# Patient Record
Sex: FEMALE | Race: WHITE | Employment: OTHER | URBAN - METROPOLITAN AREA
[De-identification: names, ages, dates, MRNs, and addresses within clinical notes are randomized per-mention and may not be internally consistent; named-entity substitution may affect disease eponyms.]

---

## 2019-12-03 ENCOUNTER — HOSPITAL ENCOUNTER (EMERGENCY)
Age: 79
Discharge: HOME | End: 2019-12-03
Attending: EMERGENCY MEDICINE
Payer: COMMERCIAL

## 2019-12-03 ENCOUNTER — APPOINTMENT (OUTPATIENT)
Dept: GENERAL RADIOLOGY | Age: 79
End: 2019-12-03
Payer: COMMERCIAL

## 2019-12-03 VITALS
DIASTOLIC BLOOD PRESSURE: 69 MMHG | TEMPERATURE: 97.9 F | SYSTOLIC BLOOD PRESSURE: 109 MMHG | OXYGEN SATURATION: 92 % | WEIGHT: 175 LBS | BODY MASS INDEX: 28.25 KG/M2 | HEART RATE: 67 BPM | RESPIRATION RATE: 19 BRPM

## 2019-12-03 DIAGNOSIS — R53.83 OTHER FATIGUE: Primary | ICD-10-CM

## 2019-12-03 DIAGNOSIS — N30.00 ACUTE CYSTITIS WITHOUT HEMATURIA: ICD-10-CM

## 2019-12-03 LAB
A/G RATIO: 1.9 (ref 1.1–2.2)
ALBUMIN SERPL-MCNC: 4.3 G/DL (ref 3.4–5)
ALP BLD-CCNC: 91 U/L (ref 40–129)
ALT SERPL-CCNC: <5 U/L (ref 10–40)
ANION GAP SERPL CALCULATED.3IONS-SCNC: 14 MMOL/L (ref 3–16)
AST SERPL-CCNC: 20 U/L (ref 15–37)
BASOPHILS ABSOLUTE: 0 K/UL (ref 0–0.2)
BASOPHILS RELATIVE PERCENT: 0.6 %
BILIRUB SERPL-MCNC: 0.9 MG/DL (ref 0–1)
BILIRUBIN URINE: ABNORMAL
BLOOD, URINE: NEGATIVE
BUN BLDV-MCNC: 25 MG/DL (ref 7–20)
CALCIUM SERPL-MCNC: 9.4 MG/DL (ref 8.3–10.6)
CHLORIDE BLD-SCNC: 100 MMOL/L (ref 99–110)
CLARITY: ABNORMAL
CO2: 27 MMOL/L (ref 21–32)
COLOR: YELLOW
CREAT SERPL-MCNC: 0.9 MG/DL (ref 0.6–1.2)
EKG ATRIAL RATE: 63 BPM
EKG DIAGNOSIS: NORMAL
EKG P AXIS: 68 DEGREES
EKG P-R INTERVAL: 184 MS
EKG Q-T INTERVAL: 454 MS
EKG QRS DURATION: 90 MS
EKG QTC CALCULATION (BAZETT): 464 MS
EKG R AXIS: -26 DEGREES
EKG T AXIS: 87 DEGREES
EKG VENTRICULAR RATE: 63 BPM
EOSINOPHILS ABSOLUTE: 0.1 K/UL (ref 0–0.6)
EOSINOPHILS RELATIVE PERCENT: 1.9 %
EPITHELIAL CELLS, UA: 0 /HPF (ref 0–5)
GFR AFRICAN AMERICAN: >60
GFR NON-AFRICAN AMERICAN: >60
GLOBULIN: 2.3 G/DL
GLUCOSE BLD-MCNC: 109 MG/DL (ref 70–99)
GLUCOSE URINE: NEGATIVE MG/DL
HCT VFR BLD CALC: 42.5 % (ref 36–48)
HEMOGLOBIN: 14.4 G/DL (ref 12–16)
HYALINE CASTS: 1 /LPF (ref 0–8)
KETONES, URINE: ABNORMAL MG/DL
LACTIC ACID, SEPSIS: 1.5 MMOL/L (ref 0.4–1.9)
LEUKOCYTE ESTERASE, URINE: ABNORMAL
LIPASE: 28 U/L (ref 13–60)
LYMPHOCYTES ABSOLUTE: 0.5 K/UL (ref 1–5.1)
LYMPHOCYTES RELATIVE PERCENT: 11.8 %
MCH RBC QN AUTO: 30.6 PG (ref 26–34)
MCHC RBC AUTO-ENTMCNC: 33.7 G/DL (ref 31–36)
MCV RBC AUTO: 90.6 FL (ref 80–100)
MICROSCOPIC EXAMINATION: YES
MONOCYTES ABSOLUTE: 0.5 K/UL (ref 0–1.3)
MONOCYTES RELATIVE PERCENT: 10.9 %
NEUTROPHILS ABSOLUTE: 3.3 K/UL (ref 1.7–7.7)
NEUTROPHILS RELATIVE PERCENT: 74.8 %
NITRITE, URINE: NEGATIVE
PDW BLD-RTO: 13.9 % (ref 12.4–15.4)
PH UA: 5.5 (ref 5–8)
PLATELET # BLD: 141 K/UL (ref 135–450)
PMV BLD AUTO: 9.9 FL (ref 5–10.5)
POTASSIUM SERPL-SCNC: 3.4 MMOL/L (ref 3.5–5.1)
PRO-BNP: 2041 PG/ML (ref 0–449)
PROTEIN UA: NEGATIVE MG/DL
RBC # BLD: 4.69 M/UL (ref 4–5.2)
RBC UA: 2 /HPF (ref 0–4)
REASON FOR REJECTION: NORMAL
REJECTED TEST: NORMAL
SODIUM BLD-SCNC: 141 MMOL/L (ref 136–145)
SPECIFIC GRAVITY UA: 1.02 (ref 1–1.03)
TOTAL PROTEIN: 6.6 G/DL (ref 6.4–8.2)
TROPONIN: <0.01 NG/ML
URINE REFLEX TO CULTURE: YES
URINE TYPE: ABNORMAL
UROBILINOGEN, URINE: 1 E.U./DL
WBC # BLD: 4.4 K/UL (ref 4–11)
WBC UA: 1 /HPF (ref 0–5)

## 2019-12-03 PROCEDURE — 87086 URINE CULTURE/COLONY COUNT: CPT

## 2019-12-03 PROCEDURE — 84484 ASSAY OF TROPONIN QUANT: CPT

## 2019-12-03 PROCEDURE — 85025 COMPLETE CBC W/AUTO DIFF WBC: CPT

## 2019-12-03 PROCEDURE — 87040 BLOOD CULTURE FOR BACTERIA: CPT

## 2019-12-03 PROCEDURE — 81001 URINALYSIS AUTO W/SCOPE: CPT

## 2019-12-03 PROCEDURE — 83605 ASSAY OF LACTIC ACID: CPT

## 2019-12-03 PROCEDURE — 80053 COMPREHEN METABOLIC PANEL: CPT

## 2019-12-03 PROCEDURE — 71045 X-RAY EXAM CHEST 1 VIEW: CPT

## 2019-12-03 PROCEDURE — 99283 EMERGENCY DEPT VISIT LOW MDM: CPT

## 2019-12-03 PROCEDURE — 93005 ELECTROCARDIOGRAM TRACING: CPT | Performed by: PHYSICIAN ASSISTANT

## 2019-12-03 PROCEDURE — 6370000000 HC RX 637 (ALT 250 FOR IP): Performed by: PHYSICIAN ASSISTANT

## 2019-12-03 PROCEDURE — 93010 ELECTROCARDIOGRAM REPORT: CPT | Performed by: INTERNAL MEDICINE

## 2019-12-03 PROCEDURE — 83880 ASSAY OF NATRIURETIC PEPTIDE: CPT

## 2019-12-03 PROCEDURE — 83690 ASSAY OF LIPASE: CPT

## 2019-12-03 RX ORDER — CEPHALEXIN 250 MG/1
500 CAPSULE ORAL ONCE
Status: COMPLETED | OUTPATIENT
Start: 2019-12-03 | End: 2019-12-03

## 2019-12-03 RX ORDER — ONDANSETRON 2 MG/ML
4 INJECTION INTRAMUSCULAR; INTRAVENOUS ONCE
Status: DISCONTINUED | OUTPATIENT
Start: 2019-12-03 | End: 2019-12-03 | Stop reason: HOSPADM

## 2019-12-03 RX ORDER — HYDROCODONE BITARTRATE AND ACETAMINOPHEN 5; 325 MG/1; MG/1
1 TABLET ORAL 3 TIMES DAILY
COMMUNITY
End: 2020-02-10 | Stop reason: CLARIF

## 2019-12-03 RX ORDER — CARVEDILOL 3.12 MG/1
3.12 TABLET ORAL 2 TIMES DAILY WITH MEALS
COMMUNITY

## 2019-12-03 RX ORDER — CARBIDOPA AND LEVODOPA 25; 100 MG/1; MG/1
2 TABLET, EXTENDED RELEASE ORAL NIGHTLY
COMMUNITY

## 2019-12-03 RX ORDER — GABAPENTIN 300 MG/1
600 CAPSULE ORAL 2 TIMES DAILY
COMMUNITY
End: 2020-02-10 | Stop reason: CLARIF

## 2019-12-03 RX ORDER — SPIRONOLACTONE 25 MG/1
12.5 TABLET ORAL DAILY
COMMUNITY

## 2019-12-03 RX ORDER — POLYETHYLENE GLYCOL 3350 17 G/17G
17 POWDER, FOR SOLUTION ORAL DAILY
COMMUNITY

## 2019-12-03 RX ORDER — ALBUTEROL SULFATE 2.5 MG/3ML
2.5 SOLUTION RESPIRATORY (INHALATION) EVERY 6 HOURS PRN
COMMUNITY

## 2019-12-03 RX ORDER — CETIRIZINE HYDROCHLORIDE 10 MG/1
10 TABLET ORAL DAILY
COMMUNITY
End: 2020-02-10 | Stop reason: CLARIF

## 2019-12-03 RX ORDER — CEPHALEXIN 500 MG/1
500 CAPSULE ORAL 2 TIMES DAILY
Qty: 14 CAPSULE | Refills: 0 | Status: SHIPPED | OUTPATIENT
Start: 2019-12-03 | End: 2019-12-10

## 2019-12-03 RX ORDER — HYDROCODONE BITARTRATE AND ACETAMINOPHEN 5; 325 MG/1; MG/1
0.5 TABLET ORAL EVERY 4 HOURS PRN
COMMUNITY
End: 2020-02-10 | Stop reason: CLARIF

## 2019-12-03 RX ADMIN — CEPHALEXIN 500 MG: 250 CAPSULE ORAL at 13:56

## 2019-12-03 ASSESSMENT — ENCOUNTER SYMPTOMS
SHORTNESS OF BREATH: 0
DIARRHEA: 0
VOMITING: 1
ABDOMINAL PAIN: 0
COUGH: 0
RHINORRHEA: 0
NAUSEA: 1

## 2019-12-05 LAB — URINE CULTURE, ROUTINE: NORMAL

## 2019-12-07 LAB
BLOOD CULTURE, ROUTINE: NORMAL
CULTURE, BLOOD 2: NORMAL

## 2020-02-10 ENCOUNTER — HOSPITAL ENCOUNTER (INPATIENT)
Age: 80
LOS: 2 days | Discharge: NURSING, REHAB OR LONG-TERM CARE | DRG: 690 | End: 2020-02-12
Attending: EMERGENCY MEDICINE | Admitting: INTERNAL MEDICINE
Payer: COMMERCIAL

## 2020-02-10 ENCOUNTER — APPOINTMENT (OUTPATIENT)
Dept: GENERAL RADIOLOGY | Age: 80
DRG: 690 | End: 2020-02-10
Payer: COMMERCIAL

## 2020-02-10 ENCOUNTER — APPOINTMENT (OUTPATIENT)
Dept: CT IMAGING | Age: 80
DRG: 690 | End: 2020-02-10
Payer: COMMERCIAL

## 2020-02-10 PROBLEM — N39.0 UTI (URINARY TRACT INFECTION): Status: ACTIVE | Noted: 2020-02-10

## 2020-02-10 LAB
A/G RATIO: 1.4 (ref 1.1–2.2)
ALBUMIN SERPL-MCNC: 4.3 G/DL (ref 3.4–5)
ALP BLD-CCNC: 113 U/L (ref 40–129)
ALT SERPL-CCNC: 8 U/L (ref 10–40)
ANION GAP SERPL CALCULATED.3IONS-SCNC: 15 MMOL/L (ref 3–16)
AST SERPL-CCNC: 17 U/L (ref 15–37)
BACTERIA: ABNORMAL /HPF
BASE EXCESS VENOUS: 9.7 MMOL/L (ref -2–3)
BASOPHILS ABSOLUTE: 0.1 K/UL (ref 0–0.2)
BASOPHILS RELATIVE PERCENT: 1 %
BILIRUB SERPL-MCNC: 0.9 MG/DL (ref 0–1)
BILIRUBIN URINE: ABNORMAL
BLOOD, URINE: ABNORMAL
BUN BLDV-MCNC: 25 MG/DL (ref 7–20)
CALCIUM SERPL-MCNC: 10.2 MG/DL (ref 8.3–10.6)
CARBOXYHEMOGLOBIN: 0.9 % (ref 0–1.5)
CHLORIDE BLD-SCNC: 96 MMOL/L (ref 99–110)
CLARITY: ABNORMAL
CO2: 30 MMOL/L (ref 21–32)
COLOR: ABNORMAL
CREAT SERPL-MCNC: 1 MG/DL (ref 0.6–1.2)
EKG ATRIAL RATE: 68 BPM
EKG DIAGNOSIS: NORMAL
EKG P-R INTERVAL: 158 MS
EKG Q-T INTERVAL: 552 MS
EKG QRS DURATION: 86 MS
EKG QTC CALCULATION (BAZETT): 586 MS
EKG R AXIS: -28 DEGREES
EKG T AXIS: 74 DEGREES
EKG VENTRICULAR RATE: 68 BPM
EOSINOPHILS ABSOLUTE: 0.2 K/UL (ref 0–0.6)
EOSINOPHILS RELATIVE PERCENT: 2.7 %
EPITHELIAL CELLS, UA: ABNORMAL /HPF
GFR AFRICAN AMERICAN: >60
GFR NON-AFRICAN AMERICAN: 53
GLOBULIN: 3 G/DL
GLUCOSE BLD-MCNC: 86 MG/DL (ref 70–99)
GLUCOSE URINE: NEGATIVE MG/DL
HCO3 VENOUS: 36.5 MMOL/L (ref 24–28)
HCT VFR BLD CALC: 46.2 % (ref 36–48)
HEMOGLOBIN, VEN, REDUCED: 71 %
HEMOGLOBIN: 15.1 G/DL (ref 12–16)
KETONES, URINE: 15 MG/DL
LACTIC ACID: 1.4 MMOL/L (ref 0.4–2)
LEUKOCYTE ESTERASE, URINE: ABNORMAL
LYMPHOCYTES ABSOLUTE: 1 K/UL (ref 1–5.1)
LYMPHOCYTES RELATIVE PERCENT: 13.3 %
MAGNESIUM: 2.1 MG/DL (ref 1.8–2.4)
MCH RBC QN AUTO: 28.8 PG (ref 26–34)
MCHC RBC AUTO-ENTMCNC: 32.6 G/DL (ref 31–36)
MCV RBC AUTO: 88.3 FL (ref 80–100)
METHEMOGLOBIN VENOUS: 0.5 % (ref 0–1.5)
MICROSCOPIC EXAMINATION: YES
MONOCYTES ABSOLUTE: 0.7 K/UL (ref 0–1.3)
MONOCYTES RELATIVE PERCENT: 8.8 %
NEUTROPHILS ABSOLUTE: 5.7 K/UL (ref 1.7–7.7)
NEUTROPHILS RELATIVE PERCENT: 74.2 %
NITRITE, URINE: NEGATIVE
O2 SAT, VEN: 28 %
PCO2, VEN: 58.6 MMHG (ref 41–51)
PDW BLD-RTO: 14.4 % (ref 12.4–15.4)
PH UA: 7.5 (ref 5–8)
PH VENOUS: 7.41 (ref 7.35–7.45)
PLATELET # BLD: 253 K/UL (ref 135–450)
PMV BLD AUTO: 10.3 FL (ref 5–10.5)
PO2, VEN: 19.7 MMHG (ref 25–40)
POTASSIUM REFLEX MAGNESIUM: 3.7 MMOL/L (ref 3.5–5.1)
PRO-BNP: 3037 PG/ML (ref 0–449)
PROTEIN UA: >=300 MG/DL
RBC # BLD: 5.23 M/UL (ref 4–5.2)
RBC UA: >100 /HPF (ref 0–2)
SODIUM BLD-SCNC: 141 MMOL/L (ref 136–145)
SPECIFIC GRAVITY UA: 1.02 (ref 1–1.03)
TCO2 CALC VENOUS: 38 MMOL/L
TOTAL PROTEIN: 7.3 G/DL (ref 6.4–8.2)
TROPONIN: 0.02 NG/ML
TROPONIN: 0.02 NG/ML
URINE REFLEX TO CULTURE: YES
URINE TYPE: ABNORMAL
UROBILINOGEN, URINE: 1 E.U./DL
WBC # BLD: 7.6 K/UL (ref 4–11)
WBC UA: >100 /HPF (ref 0–5)

## 2020-02-10 PROCEDURE — 70450 CT HEAD/BRAIN W/O DYE: CPT

## 2020-02-10 PROCEDURE — 96361 HYDRATE IV INFUSION ADD-ON: CPT

## 2020-02-10 PROCEDURE — 83880 ASSAY OF NATRIURETIC PEPTIDE: CPT

## 2020-02-10 PROCEDURE — 6360000002 HC RX W HCPCS: Performed by: EMERGENCY MEDICINE

## 2020-02-10 PROCEDURE — 87040 BLOOD CULTURE FOR BACTERIA: CPT

## 2020-02-10 PROCEDURE — 93005 ELECTROCARDIOGRAM TRACING: CPT | Performed by: EMERGENCY MEDICINE

## 2020-02-10 PROCEDURE — 99285 EMERGENCY DEPT VISIT HI MDM: CPT

## 2020-02-10 PROCEDURE — 84484 ASSAY OF TROPONIN QUANT: CPT

## 2020-02-10 PROCEDURE — 87077 CULTURE AEROBIC IDENTIFY: CPT

## 2020-02-10 PROCEDURE — 2580000003 HC RX 258: Performed by: EMERGENCY MEDICINE

## 2020-02-10 PROCEDURE — 2580000003 HC RX 258: Performed by: STUDENT IN AN ORGANIZED HEALTH CARE EDUCATION/TRAINING PROGRAM

## 2020-02-10 PROCEDURE — 87086 URINE CULTURE/COLONY COUNT: CPT

## 2020-02-10 PROCEDURE — 83605 ASSAY OF LACTIC ACID: CPT

## 2020-02-10 PROCEDURE — 83735 ASSAY OF MAGNESIUM: CPT

## 2020-02-10 PROCEDURE — 71046 X-RAY EXAM CHEST 2 VIEWS: CPT

## 2020-02-10 PROCEDURE — 85025 COMPLETE CBC W/AUTO DIFF WBC: CPT

## 2020-02-10 PROCEDURE — 96360 HYDRATION IV INFUSION INIT: CPT

## 2020-02-10 PROCEDURE — 36415 COLL VENOUS BLD VENIPUNCTURE: CPT

## 2020-02-10 PROCEDURE — 80053 COMPREHEN METABOLIC PANEL: CPT

## 2020-02-10 PROCEDURE — 81001 URINALYSIS AUTO W/SCOPE: CPT

## 2020-02-10 PROCEDURE — 82803 BLOOD GASES ANY COMBINATION: CPT

## 2020-02-10 PROCEDURE — 6360000002 HC RX W HCPCS: Performed by: STUDENT IN AN ORGANIZED HEALTH CARE EDUCATION/TRAINING PROGRAM

## 2020-02-10 PROCEDURE — 1200000000 HC SEMI PRIVATE

## 2020-02-10 RX ORDER — FLUTICASONE PROPIONATE 50 MCG
2 SPRAY, SUSPENSION (ML) NASAL DAILY
Status: DISCONTINUED | OUTPATIENT
Start: 2020-02-11 | End: 2020-02-12 | Stop reason: HOSPADM

## 2020-02-10 RX ORDER — POTASSIUM CHLORIDE 7.45 MG/ML
10 INJECTION INTRAVENOUS
Status: DISPENSED | OUTPATIENT
Start: 2020-02-10 | End: 2020-02-10

## 2020-02-10 RX ORDER — ONDANSETRON 2 MG/ML
4 INJECTION INTRAMUSCULAR; INTRAVENOUS EVERY 6 HOURS PRN
Status: CANCELLED | OUTPATIENT
Start: 2020-02-10

## 2020-02-10 RX ORDER — POTASSIUM CHLORIDE 750 MG/1
10 TABLET, EXTENDED RELEASE ORAL DAILY
Status: DISCONTINUED | OUTPATIENT
Start: 2020-02-11 | End: 2020-02-12 | Stop reason: HOSPADM

## 2020-02-10 RX ORDER — AMANTADINE HYDROCHLORIDE 100 MG/1
100 CAPSULE, GELATIN COATED ORAL DAILY
Status: DISCONTINUED | OUTPATIENT
Start: 2020-02-11 | End: 2020-02-12 | Stop reason: HOSPADM

## 2020-02-10 RX ORDER — SODIUM CHLORIDE 0.9 % (FLUSH) 0.9 %
10 SYRINGE (ML) INJECTION EVERY 12 HOURS SCHEDULED
Status: DISCONTINUED | OUTPATIENT
Start: 2020-02-10 | End: 2020-02-12 | Stop reason: HOSPADM

## 2020-02-10 RX ORDER — POTASSIUM CHLORIDE 750 MG/1
10 CAPSULE, EXTENDED RELEASE ORAL DAILY
COMMUNITY

## 2020-02-10 RX ORDER — ACETAMINOPHEN 500 MG
500 TABLET ORAL EVERY 6 HOURS PRN
Status: DISCONTINUED | OUTPATIENT
Start: 2020-02-10 | End: 2020-02-12 | Stop reason: HOSPADM

## 2020-02-10 RX ORDER — ATORVASTATIN CALCIUM 40 MG/1
40 TABLET, FILM COATED ORAL NIGHTLY
Status: DISCONTINUED | OUTPATIENT
Start: 2020-02-10 | End: 2020-02-12 | Stop reason: HOSPADM

## 2020-02-10 RX ORDER — DONEPEZIL HYDROCHLORIDE 10 MG/1
10 TABLET, ORALLY DISINTEGRATING ORAL NIGHTLY
COMMUNITY
Start: 2020-01-27

## 2020-02-10 RX ORDER — LORAZEPAM 0.5 MG/1
0.5 TABLET ORAL NIGHTLY
Status: DISCONTINUED | OUTPATIENT
Start: 2020-02-10 | End: 2020-02-12 | Stop reason: HOSPADM

## 2020-02-10 RX ORDER — THIAMINE MONONITRATE (VIT B1) 100 MG
100 TABLET ORAL DAILY
Status: DISCONTINUED | OUTPATIENT
Start: 2020-02-10 | End: 2020-02-12 | Stop reason: HOSPADM

## 2020-02-10 RX ORDER — RANITIDINE 150 MG/1
150 TABLET ORAL DAILY
COMMUNITY

## 2020-02-10 RX ORDER — ALBUTEROL SULFATE 2.5 MG/3ML
2.5 SOLUTION RESPIRATORY (INHALATION) EVERY 6 HOURS PRN
Status: DISCONTINUED | OUTPATIENT
Start: 2020-02-10 | End: 2020-02-12 | Stop reason: HOSPADM

## 2020-02-10 RX ORDER — MIRTAZAPINE 15 MG/1
15 TABLET, FILM COATED ORAL DAILY
COMMUNITY

## 2020-02-10 RX ORDER — SPIRONOLACTONE 25 MG/1
12.5 TABLET ORAL DAILY
Status: CANCELLED | OUTPATIENT
Start: 2020-02-10

## 2020-02-10 RX ORDER — GUAIFENESIN AND DEXTROMETHORPHAN HYDROBROMIDE 100; 10 MG/5ML; MG/5ML
30 SOLUTION ORAL EVERY 6 HOURS PRN
COMMUNITY

## 2020-02-10 RX ORDER — 0.9 % SODIUM CHLORIDE 0.9 %
500 INTRAVENOUS SOLUTION INTRAVENOUS ONCE
Status: COMPLETED | OUTPATIENT
Start: 2020-02-10 | End: 2020-02-10

## 2020-02-10 RX ORDER — ERGOCALCIFEROL 1.25 MG/1
50000 CAPSULE ORAL WEEKLY
Status: DISCONTINUED | OUTPATIENT
Start: 2020-02-17 | End: 2020-02-12 | Stop reason: HOSPADM

## 2020-02-10 RX ORDER — CARBIDOPA AND LEVODOPA 25; 100 MG/1; MG/1
2 TABLET, EXTENDED RELEASE ORAL NIGHTLY
Status: DISCONTINUED | OUTPATIENT
Start: 2020-02-10 | End: 2020-02-12 | Stop reason: HOSPADM

## 2020-02-10 RX ORDER — ERGOCALCIFEROL 1.25 MG/1
50000 CAPSULE ORAL WEEKLY
COMMUNITY

## 2020-02-10 RX ORDER — DONEPEZIL HYDROCHLORIDE 10 MG/1
10 TABLET, FILM COATED ORAL NIGHTLY
Status: DISCONTINUED | OUTPATIENT
Start: 2020-02-10 | End: 2020-02-12 | Stop reason: HOSPADM

## 2020-02-10 RX ORDER — GUAIFENESIN/DEXTROMETHORPHAN 100-10MG/5
10 SYRUP ORAL EVERY 6 HOURS PRN
Status: DISCONTINUED | OUTPATIENT
Start: 2020-02-10 | End: 2020-02-12 | Stop reason: HOSPADM

## 2020-02-10 RX ORDER — CARVEDILOL 3.12 MG/1
3.12 TABLET ORAL 2 TIMES DAILY WITH MEALS
Status: DISCONTINUED | OUTPATIENT
Start: 2020-02-10 | End: 2020-02-12 | Stop reason: HOSPADM

## 2020-02-10 RX ORDER — FAMOTIDINE 20 MG/1
20 TABLET, FILM COATED ORAL DAILY
Status: DISCONTINUED | OUTPATIENT
Start: 2020-02-11 | End: 2020-02-12 | Stop reason: HOSPADM

## 2020-02-10 RX ORDER — FAMOTIDINE 20 MG/1
20 TABLET, FILM COATED ORAL DAILY
Status: CANCELLED | OUTPATIENT
Start: 2020-02-10

## 2020-02-10 RX ORDER — GABAPENTIN 100 MG/1
100 CAPSULE ORAL EVERY 8 HOURS
COMMUNITY
Start: 2020-02-03

## 2020-02-10 RX ORDER — AMANTADINE HYDROCHLORIDE 100 MG/1
100 TABLET ORAL DAILY
COMMUNITY
Start: 2020-02-05

## 2020-02-10 RX ORDER — MIRTAZAPINE 15 MG/1
15 TABLET, FILM COATED ORAL DAILY
Status: CANCELLED | OUTPATIENT
Start: 2020-02-10

## 2020-02-10 RX ORDER — SENNA PLUS 8.6 MG/1
2 TABLET ORAL NIGHTLY
Status: DISCONTINUED | OUTPATIENT
Start: 2020-02-10 | End: 2020-02-12 | Stop reason: HOSPADM

## 2020-02-10 RX ORDER — SODIUM CHLORIDE 0.9 % (FLUSH) 0.9 %
10 SYRINGE (ML) INJECTION PRN
Status: DISCONTINUED | OUTPATIENT
Start: 2020-02-10 | End: 2020-02-12 | Stop reason: HOSPADM

## 2020-02-10 RX ORDER — ATORVASTATIN CALCIUM 40 MG/1
40 TABLET, FILM COATED ORAL NIGHTLY
COMMUNITY

## 2020-02-10 RX ORDER — FUROSEMIDE 20 MG/1
20 TABLET ORAL DAILY
Status: DISCONTINUED | OUTPATIENT
Start: 2020-02-11 | End: 2020-02-12 | Stop reason: HOSPADM

## 2020-02-10 RX ORDER — POLYETHYLENE GLYCOL 3350 17 G/17G
17 POWDER, FOR SOLUTION ORAL DAILY
Status: DISCONTINUED | OUTPATIENT
Start: 2020-02-11 | End: 2020-02-12 | Stop reason: HOSPADM

## 2020-02-10 RX ORDER — SENNA PLUS 8.6 MG/1
2 TABLET ORAL NIGHTLY
COMMUNITY

## 2020-02-10 RX ADMIN — SODIUM CHLORIDE 500 ML: 9 INJECTION, SOLUTION INTRAVENOUS at 15:06

## 2020-02-10 RX ADMIN — Medication 10 ML: at 21:23

## 2020-02-10 RX ADMIN — ENOXAPARIN SODIUM 40 MG: 40 INJECTION SUBCUTANEOUS at 21:54

## 2020-02-10 RX ADMIN — CEFTRIAXONE SODIUM 2 G: 2 INJECTION, POWDER, FOR SOLUTION INTRAMUSCULAR; INTRAVENOUS at 16:53

## 2020-02-10 RX ADMIN — POTASSIUM CHLORIDE 10 MEQ: 7.46 INJECTION, SOLUTION INTRAVENOUS at 21:20

## 2020-02-10 ASSESSMENT — ENCOUNTER SYMPTOMS
SHORTNESS OF BREATH: 0
CHEST TIGHTNESS: 0
DIARRHEA: 0
ABDOMINAL PAIN: 0
CHOKING: 0
CONSTIPATION: 0
COUGH: 0
WHEEZING: 0
NAUSEA: 0
VOMITING: 0
ABDOMINAL DISTENTION: 0

## 2020-02-10 ASSESSMENT — PAIN SCALES - GENERAL
PAINLEVEL_OUTOF10: 0
PAINLEVEL_OUTOF10: 0

## 2020-02-10 NOTE — ED PROVIDER NOTES
[address] ENCOUNTER          ATTENDING PHYSICIAN NOTE       Date of evaluation: 2/10/2020    Chief Complaint     Failure To Thrive (EMS called by family for patient's progressive decline, worse over past week, hx Parkinsons, patient A/O but slow to respond)      History of Present Illness     Beba Pritchett is a [de-identified] y.o. female who presents with failure to thrive. The patient is currently in a skilled nursing facility and has suffered from Parkinson's for about 20 years. She has been experiencing a functional decline over the last couple of months although this seems worse over the last few days. On Wednesday of last week she was noted to still be conversant but today the nursing home called because she was not very communicative. The patient seems to require significant effort to get out even a word or 2. Her family states that when they first arrived at the nursing home she was very sleepy although she seems to have perked up now. They are concerned that she may have an infection or something else going on. The patient denies pain, chest pain, shortness of breath, nausea, I am unable to get further history from her due to her difficulty with verbalization. Review of Systems     Negative for fever, vomiting, chest pain, shortness breath, abdominal pain. All other systems were reviewed and are negative, except as mentioned in HPI. Past Medical, Surgical, Family, and Social History     She has a past medical history of Acute MI ([address] Arthritis, Back pain, CAD (coronary artery disease), Hypercholesteremia, and Parkinson disease ([address] She has a past surgical history that includes Cardiac surgery and brain surgery. Her family history includes Heart Disease in her sister; High Blood Pressure in her mother. She reports that she quit smoking about 39 years ago.  She has never used smokeless tobacco. She reports that she does not drink alcohol or use VITAMIN D (CHOLECALCIFEROL) 1000 UNITS CAPS CAPSULE    Take 150,000 Units by mouth once a week        Allergies     She is allergic to codeine. Physical Exam     INITIAL VITALS: BP: (!) 123/56, Temp: 97.8 °F (36.6 °C), Pulse: 70, Resp: 16, SpO2: 92 %       General:  Well appearing. No acute distress. Eyes:  Pupils reactive. No discharge from eyes. ENT:  No discharge from nose. OP with tacky mucous membranes    Neck:  Supple. Pulmonary:   Non-labored breathing. Breath sounds clear bilaterally. Cardiac:  Regular rate and rhythm. No murmurs. Abdomen:  Soft. Non-tender. Non-distended. Musculoskeletal:  No CVA tenderness. Skin:  No rash. Neuro:  Alert and oriented. CN II-XII intact. Globally weak although nonfocal.  sensation grossly intact to light touch. Mentation seems appropriate although the patient has difficulty with speech, speech is not slurred she just requires a lot of effort to get out words although not in any phasic manner. Gait narrow and stable. Extremities:  Warm and perfused. No peripheral edema. Diagnostic Results     LABS:   Please see electronic medical record for laboratory tests performed in the ED. RADIOLOGY:  Please see electronic medical record for imaging studies performed in the ED. EKG   Please see medical record for specific interval measurements. Impression: Normal sinus rhythm at 68 bpm, normal intervals aside from prolonged QT, leftward axis, no acute ST or T wave changes compared to December 3, 2019        RECENT VITALS:  BP: (!) 147/74, Temp: 97.8 °F (36.6 °C), Pulse: 76, Resp: 20     Procedures         ED Course     Nursing Notes, Past Medical Hx, Past Surgical Hx, Social Hx, Allergies, and Family Hx were reviewed.     The patient was given the following medications:  Orders Placed This Encounter   Medications    0.9 % sodium chloride bolus    cefTRIAXone (ROCEPHIN) 2 g IVPB in D5W 50ml minibag       CONSULTS:  IP CONSULT TO

## 2020-02-10 NOTE — H&P
Internal Medicine  History & Physical      CC Failure to thrive    History Obtained From:  patient    HISTORY OF PRESENT ILLNESS:    Ms. Khalida Ramachandran is a 51-year-old female with a history of parkinsons, CHF (EF 40%, mild concentric left hypertrophy 11/30/15), CAD, aortic stenosis and MR who was transferred from her SNF as a result of increased fatigue and decreased communication from baseline. She had poor phonation and was difficulty to understand at times. She reports dysuria and fatigue but no other complaints. Past Medical History:        Diagnosis Date    Acute MI ([address]     Arthritis     Back pain     CAD (coronary artery disease)     Hypercholesteremia     Parkinson disease (HCC)    ·     Past Surgical History:        Procedure Laterality Date    BRAIN SURGERY      FOR PARKINSONS    CARDIAC SURGERY      angioplasty stent x 1   ·     Medications Priorto Admission:    · Not in a hospital admission. Allergies:  Codeine    Social History:   · TOBACCO:   reports that she quit smoking about 39 years ago. She has never used smokeless tobacco.  · ETOH:   reports no history of alcohol use. · DRUGS :   · Patient currently lives in a nursing home  ·   Family History:       Problem Relation Age of Onset    High Blood Pressure Mother     Heart Disease Sister    ·     Review of Systems   Constitutional: Positive for activity change and fatigue. Negative for appetite change, chills, diaphoresis and fever. Respiratory: Negative for cough, choking, chest tightness, shortness of breath and wheezing. Cardiovascular: Negative for chest pain, palpitations and leg swelling. Gastrointestinal: Negative for abdominal distention, abdominal pain, constipation, diarrhea, nausea and vomiting. Genitourinary: Positive for dysuria. Negative for flank pain. Neurological: Negative for dizziness, weakness, light-headedness, numbness and headaches.        ROS: A 10 point review of systems was conducted, significant 66-year-old female    Altered mental status likely due to UTI. Complains of dysuria. UA demonstrated WBC >100, bacteria 4+ and leuk esterase +. Lactic acid 1.4, CXR unremarkable. - ceftriaxone 2g IV  - IVF    Parkinsons  - continue home amantadine, donepezil, carbidopa-levodopa  - SLP consult  -PT/OT consult  - f/u MBS     CHF  - Continue home carvedilol with holding parameters  - Hold furosemide.  Appeared dried and BP WNL    CAD   - continue home atorvastatin      Will discuss with attending physician     Code Status: DNI  FEN:   PPX:   DISPO: Hector Vázquez  2/10/2020,  5:56 PM

## 2020-02-10 NOTE — H&P
Internal Medicine  PGY 1  History & Physical      CC Dysuria    History Obtained From:  Patient, ED report    HISTORY OF PRESENT ILLNESS: Naun Montenegro is a [de-identified] yo F pmhx parkinson's disease, CAD w/ prior MI who presents with dysuria. She resides at a SNF and has been having subacute functional decline acutely worsened today when she was noted by staff to be less communicative, minimally verbal. Family reported in the ED that she seemed to be very sleepy. UTI in the ED concerning for possible infection and she received a dose of rocephin in the ED and was also given some fluids as she was clinically hypovolemic on arrival. Otherwise on workup, no leukocytosis, no significant electrolyte derangements, glucose WNL. Slightly elevated troponin to 0.02. EKG w/ QTc 586 ms but otherwise stable vs 12/2019 EKG. Presently she reports that she feels overall well and at baseline, except that for the past 3 days she has had worsening dysuria and fatigue. Otherwise she has no complaints and denies other associated symptoms including no sweats, fevers, chills, n/v/abdominal pain. No identified exacerbating or mitigating factors. She feels \"fine\" otherwise. Pt reports she does not want intubation but otherwise is full code including chest compressions, defibrillation/cardioversion, and cardioresuscitative medications    Past Medical History:        Diagnosis Date    Acute MI ([address]     Arthritis     Back pain     CAD (coronary artery disease)     Hypercholesteremia     Parkinson disease ([facility]    ·     Past Surgical History:        Procedure Laterality Date    BRAIN SURGERY      FOR PARKINSONS    CARDIAC SURGERY      angioplasty stent x 1   ·     Medications Priorto Admission:    · Not in a hospital admission. Allergies:  Codeine    Social History:   · TOBACCO:   reports that she quit smoking about 39 years ago. She has never used smokeless tobacco.  · ETOH:   reports no history of alcohol use.   · DRUGS : None  · Patient currently lives at [facility]  ·   Family History:       Problem Relation Age of Onset    High Blood Pressure Mother     Heart Disease Sister    ·     Review of Systems   Constitutional: Positive for fatigue. Genitourinary: Positive for dysuria. All other systems reviewed and are negative. ROS: A 10 point review of systems was conducted, significant findings as noted in HPI. Physical Exam  Constitutional:       General: She is not in acute distress. Appearance: She is ill-appearing. HENT:      Head: Normocephalic and atraumatic. Right Ear: External ear normal.      Left Ear: External ear normal.      Nose: Nose normal. No rhinorrhea. Mouth/Throat:      Mouth: Mucous membranes are moist.      Pharynx: Oropharynx is clear. Eyes:      General: No scleral icterus. Right eye: No discharge. Left eye: No discharge. Extraocular Movements: Extraocular movements intact. Conjunctiva/sclera: Conjunctivae normal.   Neck:      Musculoskeletal: Normal range of motion and neck supple. Cardiovascular:      Rate and Rhythm: Normal rate and regular rhythm. Pulses: Normal pulses. Heart sounds: Murmur (holosystolic best ascultated over aortic area) present. Pulmonary:      Effort: Pulmonary effort is normal.      Breath sounds: Normal breath sounds. Abdominal:      General: There is no distension. Palpations: Abdomen is soft. Tenderness: There is no abdominal tenderness. Musculoskeletal:         General: No swelling or deformity. Right lower leg: No edema. Left lower leg: No edema. Skin:     General: Skin is warm and dry. Neurological:      General: No focal deficit present. Mental Status: She is alert and oriented to person, place, and time. Cranial Nerves: No cranial nerve deficit. Comments: Slowed, quiet speech.  slowed movements of limbs and face generally   Psychiatric:         Mood and Affect: Mood normal. Behavior: Behavior normal.       Physical exam:       Vitals:    02/10/20 1530   BP: (!) 147/74   Pulse: 76   Resp: 20   Temp:    SpO2:        DATA:    Labs:  CBC:   Recent Labs     02/10/20  1509   WBC 7.6   HGB 15.1   HCT 46.2          BMP:   Recent Labs     02/10/20  1509      K 3.7   CL 96*   CO2 30   BUN 25*   CREATININE 1.0   GLUCOSE 86     LFT's:   Recent Labs     02/10/20  1509   AST 17   ALT 8*   BILITOT 0.9   ALKPHOS 113     Troponin:   Recent Labs     02/10/20  1509   TROPONINI 0.02*     BNP:No results for input(s): BNP in the last 72 hours. ABGs: No results for input(s): PHART, DGK3QBC, PO2ART in the last 72 hours. INR: No results for input(s): INR in the last 72 hours. U/A:  Recent Labs     02/10/20  1535   COLORU BROWN*   PHUR 7.5   WBCUA >100*   RBCUA >100*   BACTERIA 4+*   CLARITYU CLOUDY*   SPECGRAV 1.025   LEUKOCYTESUR MODERATE*   UROBILINOGEN 1.0   BILIRUBINUR SMALL*   BLOODU LARGE*   GLUCOSEU Negative       CT Head WO Contrast   Final Result   Impression:       No acute intracranial abnormality. XR CHEST STANDARD (2 VW)   Final Result   1. No acute disease. ASSESSMENT AND PLAN:  [de-identified] yo F pmhx parkinson's disease here with UTI    #UTI: VSS, SIRS 0. no leukocytosis, lactate 1.4, not septic  - f/u UCx  - c/w rocephin    #Parkinson's Disease  - continue home amantadine, sinemet, aricept  - supportive care including q2h turns, elevate heels, routine skin care  - c/w home breathing treatments, mucinex  - zantac, bowel regimen  - thiamine, calcium supplements  - PT, OT, SLP  - dietician consulted    #Chronic Systolic CHF: clinically stable as no increased o2 requirement, vitals stable. last known echo 2015 w/ EF 40%. Also w/ holosystolic murmur best heard over aortic area - moderate AS on prior echo  - repeat echo  - c/w home coreg  - received fluids as hypovolemic clinically in ED. BNP elevated to 3k, above recent prior of 2k so will defer further fluids.  Will

## 2020-02-10 NOTE — ED NOTES
Bed: B20-20  Expected date:   Expected time:   Means of arrival:   Comments:  Frank Sauceda RN  02/10/20 8338

## 2020-02-11 ENCOUNTER — APPOINTMENT (OUTPATIENT)
Dept: GENERAL RADIOLOGY | Age: 80
DRG: 690 | End: 2020-02-11
Payer: COMMERCIAL

## 2020-02-11 LAB
ANION GAP SERPL CALCULATED.3IONS-SCNC: 16 MMOL/L (ref 3–16)
BASOPHILS ABSOLUTE: 0.1 K/UL (ref 0–0.2)
BASOPHILS RELATIVE PERCENT: 0.9 %
BUN BLDV-MCNC: 26 MG/DL (ref 7–20)
CALCIUM SERPL-MCNC: 9.6 MG/DL (ref 8.3–10.6)
CHLORIDE BLD-SCNC: 100 MMOL/L (ref 99–110)
CO2: 27 MMOL/L (ref 21–32)
CREAT SERPL-MCNC: 0.8 MG/DL (ref 0.6–1.2)
EOSINOPHILS ABSOLUTE: 0.1 K/UL (ref 0–0.6)
EOSINOPHILS RELATIVE PERCENT: 1.5 %
GFR AFRICAN AMERICAN: >60
GFR NON-AFRICAN AMERICAN: >60
GLUCOSE BLD-MCNC: 86 MG/DL (ref 70–99)
HCT VFR BLD CALC: 41.7 % (ref 36–48)
HEMOGLOBIN: 13.7 G/DL (ref 12–16)
LYMPHOCYTES ABSOLUTE: 0.9 K/UL (ref 1–5.1)
LYMPHOCYTES RELATIVE PERCENT: 11.7 %
MCH RBC QN AUTO: 28.8 PG (ref 26–34)
MCHC RBC AUTO-ENTMCNC: 32.8 G/DL (ref 31–36)
MCV RBC AUTO: 87.6 FL (ref 80–100)
MONOCYTES ABSOLUTE: 0.7 K/UL (ref 0–1.3)
MONOCYTES RELATIVE PERCENT: 9.2 %
NEUTROPHILS ABSOLUTE: 5.9 K/UL (ref 1.7–7.7)
NEUTROPHILS RELATIVE PERCENT: 76.7 %
ORGANISM: ABNORMAL
PDW BLD-RTO: 14.3 % (ref 12.4–15.4)
PLATELET # BLD: 242 K/UL (ref 135–450)
PMV BLD AUTO: 9.9 FL (ref 5–10.5)
POTASSIUM REFLEX MAGNESIUM: 3.6 MMOL/L (ref 3.5–5.1)
RBC # BLD: 4.76 M/UL (ref 4–5.2)
SODIUM BLD-SCNC: 143 MMOL/L (ref 136–145)
TROPONIN: 0.03 NG/ML
URINE CULTURE, ROUTINE: ABNORMAL
WBC # BLD: 7.7 K/UL (ref 4–11)

## 2020-02-11 PROCEDURE — 6370000000 HC RX 637 (ALT 250 FOR IP): Performed by: STUDENT IN AN ORGANIZED HEALTH CARE EDUCATION/TRAINING PROGRAM

## 2020-02-11 PROCEDURE — 6360000002 HC RX W HCPCS: Performed by: STUDENT IN AN ORGANIZED HEALTH CARE EDUCATION/TRAINING PROGRAM

## 2020-02-11 PROCEDURE — 85025 COMPLETE CBC W/AUTO DIFF WBC: CPT

## 2020-02-11 PROCEDURE — 1200000000 HC SEMI PRIVATE

## 2020-02-11 PROCEDURE — C8929 TTE W OR WO FOL WCON,DOPPLER: HCPCS

## 2020-02-11 PROCEDURE — 92611 MOTION FLUOROSCOPY/SWALLOW: CPT

## 2020-02-11 PROCEDURE — 36415 COLL VENOUS BLD VENIPUNCTURE: CPT

## 2020-02-11 PROCEDURE — 92610 EVALUATE SWALLOWING FUNCTION: CPT

## 2020-02-11 PROCEDURE — 2580000003 HC RX 258: Performed by: STUDENT IN AN ORGANIZED HEALTH CARE EDUCATION/TRAINING PROGRAM

## 2020-02-11 PROCEDURE — 84484 ASSAY OF TROPONIN QUANT: CPT

## 2020-02-11 PROCEDURE — 97530 THERAPEUTIC ACTIVITIES: CPT

## 2020-02-11 PROCEDURE — 80048 BASIC METABOLIC PNL TOTAL CA: CPT

## 2020-02-11 PROCEDURE — 97166 OT EVAL MOD COMPLEX 45 MIN: CPT

## 2020-02-11 PROCEDURE — 92526 ORAL FUNCTION THERAPY: CPT

## 2020-02-11 PROCEDURE — 6360000004 HC RX CONTRAST MEDICATION: Performed by: STUDENT IN AN ORGANIZED HEALTH CARE EDUCATION/TRAINING PROGRAM

## 2020-02-11 PROCEDURE — 74230 X-RAY XM SWLNG FUNCJ C+: CPT

## 2020-02-11 RX ORDER — POTASSIUM CHLORIDE 20 MEQ/1
20 TABLET, EXTENDED RELEASE ORAL ONCE
Status: COMPLETED | OUTPATIENT
Start: 2020-02-11 | End: 2020-02-11

## 2020-02-11 RX ORDER — TETRAHYDROZOLINE HCL 0.05 %
1 DROPS OPHTHALMIC (EYE) EVERY 4 HOURS PRN
Status: DISCONTINUED | OUTPATIENT
Start: 2020-02-11 | End: 2020-02-12 | Stop reason: HOSPADM

## 2020-02-11 RX ADMIN — CARBIDOPA AND LEVODOPA 2 TABLET: 25; 100 TABLET, EXTENDED RELEASE ORAL at 22:34

## 2020-02-11 RX ADMIN — CEFTRIAXONE 1 G: 1 INJECTION, POWDER, FOR SOLUTION INTRAMUSCULAR; INTRAVENOUS at 08:38

## 2020-02-11 RX ADMIN — FLUTICASONE PROPIONATE 2 SPRAY: 50 SPRAY, METERED NASAL at 08:42

## 2020-02-11 RX ADMIN — CARVEDILOL 3.12 MG: 3.12 TABLET, FILM COATED ORAL at 12:43

## 2020-02-11 RX ADMIN — CARBIDOPA AND LEVODOPA 1.5 TABLET: 25; 100 TABLET ORAL at 22:34

## 2020-02-11 RX ADMIN — SENNOSIDES 17.2 MG: 8.6 TABLET, FILM COATED ORAL at 22:35

## 2020-02-11 RX ADMIN — FUROSEMIDE 20 MG: 20 TABLET ORAL at 12:43

## 2020-02-11 RX ADMIN — CARVEDILOL 3.12 MG: 3.12 TABLET, FILM COATED ORAL at 16:41

## 2020-02-11 RX ADMIN — DONEPEZIL HYDROCHLORIDE 10 MG: 10 TABLET, FILM COATED ORAL at 22:35

## 2020-02-11 RX ADMIN — AMANTADINE HYDROCHLORIDE 100 MG: 100 CAPSULE ORAL at 12:43

## 2020-02-11 RX ADMIN — POTASSIUM CHLORIDE 10 MEQ: 10 TABLET, EXTENDED RELEASE ORAL at 12:44

## 2020-02-11 RX ADMIN — POTASSIUM CHLORIDE 20 MEQ: 20 TABLET, EXTENDED RELEASE ORAL at 12:43

## 2020-02-11 RX ADMIN — CARBIDOPA AND LEVODOPA 1.5 TABLET: 25; 100 TABLET ORAL at 12:42

## 2020-02-11 RX ADMIN — LORAZEPAM 0.5 MG: 0.5 TABLET ORAL at 22:35

## 2020-02-11 RX ADMIN — Medication 100 MG: at 12:44

## 2020-02-11 RX ADMIN — ATORVASTATIN CALCIUM 40 MG: 40 TABLET, FILM COATED ORAL at 22:35

## 2020-02-11 RX ADMIN — Medication 10 ML: at 22:35

## 2020-02-11 RX ADMIN — ENOXAPARIN SODIUM 40 MG: 40 INJECTION SUBCUTANEOUS at 22:35

## 2020-02-11 RX ADMIN — PERFLUTREN 1.65 MG: 6.52 INJECTION, SUSPENSION INTRAVENOUS at 12:09

## 2020-02-11 RX ADMIN — FAMOTIDINE 20 MG: 20 TABLET ORAL at 12:42

## 2020-02-11 RX ADMIN — POLYETHYLENE GLYCOL 3350 17 G: 17 POWDER, FOR SOLUTION ORAL at 12:44

## 2020-02-11 RX ADMIN — CARBIDOPA AND LEVODOPA 1.5 TABLET: 25; 100 TABLET ORAL at 16:41

## 2020-02-11 ASSESSMENT — PAIN SCALES - GENERAL
PAINLEVEL_OUTOF10: 0
PAINLEVEL_OUTOF10: 0

## 2020-02-11 NOTE — PLAN OF CARE
Problem: Falls - Risk of:  Goal: Will remain free from falls  Description  Will remain free from falls  Outcome: Ongoing  Note:   Patient at risk for falls. Patient resting quietly in bed. Side rails up x 2/4. Bed locked in lowest position. Bed alarm on. Bedside table and call light within reach. Patient instructed to call for assistance. Patient verbalized understanding. Will continue to monitor. Problem: Risk for Impaired Skin Integrity  Goal: Tissue integrity - skin and mucous membranes  Description  Structural intactness and normal physiological function of skin and  mucous membranes. Outcome: Ongoing  Note:   Pt is at risk for skin breakdown. Pt has bruising to L hip/thigh/knee and redness to buttocks. Barrier cream applied to buttocks. Incontinence care provided q2h and prn. Assist with repositioning.

## 2020-02-11 NOTE — PROCEDURES
Administered: Dysphagia Soft and Bite-Sized (Dysphagia III); Dysphagia Pureed (Dysphagia I); Thin straw; Thin teaspoon; Thin cup       Dysphagia Outcome Severity Scale: Level 5: Mild dysphagia- Distant supervision. May need one diet consistency restricted  Penetration-Aspiration Scale (PAS): 2 - Material enters the airway, remains above the vocal folds, and is ejected from the airway(occured 2 x, once w/straw and 1 x cup)    Recommended Diet:  Solid consistency: Dysphagia Minced and Moist (Dysphagia II)  Liquid consistency: Thin  Liquid administration via: Straw;Cup  Medication administration: Meds in puree    Safe Swallow Protocol: supervision and assistance  Supervision: Close  Compensatory Swallowing Strategies:   Small bites/sips;  Upright as possible for all oral intake  Check for pocketing of food     Recommendations/Treatment  Requires SLP Intervention: Yes  D/C Recommendations: (ongoing follow up upon dc, as SLP was following prior to admit)     Recommended Exercises:    Therapeutic Interventions: Diet tolerance monitoring;Patient/Family education;Oral care    Education: Images and recommendations were reviewed with pt and daughter in law following this exam.   Patient Education: Pt and daughter in law educated to results of MBS  Patient Education Response: Verbalizes understanding;Needs reinforcement    Prognosis for safe diet advancement: fair(Parkinson's)  Barriers to reach goals: (history of Parkinson's)  Duration/Frequency of Treatment  Duration/Frequency of Treatment: 2-3 x    Goals:    1- The patient will tolerate instrumental swallowing procedure   2/11:  Goal met    2- The patient Elisa Hedger will verbalize/demonstrate understanding of dysphagia recommendations  2/11: Educated pt and daughter in law to purpose of visit, s/s of aspiration, silent aspiration and concern if aspiration occurs, as well as rationale for MBS recommendation .  Both stated comprehension and agreeable  Cont goal  2/11: s/p MBS: pt and daughter in law educated to anatomy/physiology of swallow, results of MBS and strategies to increase safety of swallow. Emphasized importance of being upright with all PO (daughter in law states pt chair is not in optimal position at this time but is getting a new chair). Also the importance of chewing carefull and checking for oral residue. Both stated comprehension but will benefit from cont education and intervention  Cont goal    New goal:  3-  Pt will consume PO safely without signs or symptoms of aspiration    Oral Preparation / Oral Phase  Pt exhibiting oral phase dysphagia, characterized by  very slow mastication and posterior propulsion with soft solid, with min residue in oral cavity. Pt demonstrated adequate labial seal, no anterior loss of bolus noted. Pharyngeal Phase  Pt exhibited intermittent flash penetration with spontaneous clearing, of thin liquids (1 x cup and 1 x straw, with multiple trials presented). No deep penetration or aspiration was identified. There was no pharyngeal residue. Esophageal Phase  Not assessed    Pain   Patient Currently in Pain: No    Therapy Time:   Individual Concurrent Group Co-treatment   Time In 1035         Time Out 1100         Minutes 25            Plan:  Recommended diet: Dysphagia II minced and moist with thin liquids  Dc recommendation: follow up by SLP at [facility] upon [address] [facility] #[id number]  Pg.  # [phone]  Needs met prior to leaving radiology, d/w ELSY Law  This document will serve as a dc summary if pt dc prior to next visit  2/11/2020, 11:22 AM

## 2020-02-11 NOTE — PROGRESS NOTES
Occupational Therapy   Occupational Therapy Initial Assessment/ Discharge Note   Date: 2020   Patient Name: Vik Norton  MRN: [id number]     : 1940    Date of Service: 2020  Assessment: Functional mobility and ADL performance is likely at or near baseline. Pt is a resident in an ECF and DIL reports pt is dependent for all ADLs and transfers at baseline. Pt requiring Max A x2 for funtional transfer from EOB to chair during session. D/c patient from acute OT services due to no OT needs identified due to pt being at or near baseline level. Recommend pt return to [facility]. Discharge Recommendations:Arlene C Stuhlreyer scored a  on the AM-PAC ADL Inpatient form. At this time, no further OT is recommended upon discharge due to pt dependent at baseline and no acute OT needs identified. Recommend patient returns to prior setting with prior services. OT Equipment Recommendations  Equipment Needed: No    Assessment   Decision Making: Medium Complexity  No Skilled OT: No OT goals identified; At baseline function  REQUIRES OT FOLLOW UP: No  Activity Tolerance  Activity Tolerance: Patient Tolerated treatment well  Safety Devices  Safety Devices in place: Yes  Type of devices: Call light within reach; Chair alarm in place; Left in chair;Nurse notified(Assist level on board )            Restrictions  Position Activity Restriction  Other position/activity restrictions: up with assist     Subjective   General  Chart Reviewed: Yes  Additional Pertinent Hx: [de-identified] y.o. F admitted 2/10 for UTI. PMHx: parkinsons disease, hypercholesteremia, CAD, back pain, acute MI, cardiac surgery, brain surgery   Referring Practitioner: Lula   Diagnosis: UTI   Subjective  Subjective: Pt in bed upon entry with daughter feeding her ice chips. Pleasant and agreeable to therapy.    General Comment  Comments: Noted troponin trending upwards; RN approved tx session   Patient Currently in Pain: Denies  Pain No  Coordination and Movement description: Decreased speed     Bed mobility  Supine to Sit: Moderate assistance(Mod x2 for legs and trunk; HOB raised, handrails used, slow and effortful movement )  Scootin Person assistance;Dependent/Total(Mod A for scooting in bed; Dependent x2 for scooting in chair )  Transfers  Stand Pivot Transfers: 2 Person assistance;Maximum assistance  Sit to stand: 2 Person assistance;Maximum assistance  Stand to sit: 2 Person assistance;Maximum assistance     Cognition  Overall Cognitive Status: Exceptions  Arousal/Alertness: Appropriate responses to stimuli  Following Commands: Follows one step commands with increased time  Attention Span: Appears intact  Safety Judgement: Decreased awareness of need for assistance;Decreased awareness of need for safety  Insights: Decreased awareness of deficits  Initiation: Requires cues for some  Sequencing: Requires cues for some                 LUE AROM (degrees)  LUE AROM : WFL  RUE AROM (degrees)  RUE AROM : WFL  LUE Strength  LUE Strength Comment: grossly 3/5   RUE Strength  RUE Strength Comment: grossly 3/5                    Plan D/C OT services. AM-[facility] Score  AM-[facility] Inpatient Daily Activity Raw Score: 8 (20)  AM-PAC Inpatient ADL T-Scale Score : 22.86 (20)  ADL Inpatient CMS 0-100% Score: 85.69 (20)  ADL Inpatient CMS G-Code Modifier : CM (20)      Therapy Time   Individual Concurrent Group Co-treatment   Time In 09         Time Out 0939         Minutes 38         Timed Code Tx Min:23  Total Tx Time:38    Therapist was present, directed the patient's care, made skilled judgment, and was responsible for assessment and treatment of the patient.       Madelyne Eisenmenger, s/OT   (Colette Dooley OTR/L, [id number])

## 2020-02-11 NOTE — PROGRESS NOTES
RESPIRATORY THERAPY ASSESSMENT    Name:  Dimitrios Michel  Medical Record Number:  [id number]  Age: [de-identified] y.o. Gender: female  : 1940  Today's Date:  2/10/2020  Room:  6309/6309-01    Assessment     Is the patient being admitted for a COPD or Asthma exacerbation? No   (If yes the patient will be seen every 4 hours for the first 24 hours and then reassessed)    Patient Admission Diagnosis      Allergies  Allergies   Allergen Reactions    Codeine Anxiety       Minimum Predicted Vital Capacity:     748          Actual Vital Capacity:      750              Pulmonary History:former smoker  Home Oxygen Therapy:  room air  Home Respiratory Therapy:Albuterol   Current Respiratory Therapy:  hhn albuterol prn          Respiratory Severity Index(RSI)   Patients with orders for inhalation medications, oxygen, or any therapeutic treatment modality will be placed on Respiratory Protocol. They will be assessed with the first treatment and at least every 72 hours thereafter. The following severity scale will be used to determine frequency of treatment intervention.     Smoking History: Pulmonary Disease or Smoking History, Greater than 15 pack year = 2    Social History  Social History     Tobacco Use    Smoking status: Former Smoker     Last attempt to quit: 1980     Years since quittin.6    Smokeless tobacco: Never Used   Substance Use Topics    Alcohol use: No    Drug use: No       Recent Surgical History: None = 0  Past Surgical History  Past Surgical History:   Procedure Laterality Date    BRAIN SURGERY      FOR PARKINSONS    CARDIAC SURGERY      angioplasty stent x 1       Level of Consciousness: Alert, Oriented, and Cooperative = 0    Level of Activity: Walking with assistance = 1    Respiratory Pattern: Regular Pattern; RR 8-20 = 0    Breath Sounds: Clear = 0    Sputum   ,  ,    Cough: Strong, spontaneous, non-productive = 0    Vital Signs   /70   Pulse 69   Temp 97 °F (36.1 °C) (Oral) (MDI), HHN, Aerogen to intubated patients on mechanical ventilation. 6. Inhalation medication orders will be delivered and/or substituted as outlined below. Aerosolized Medications Ordering and Administration Guidelines:    1. All Medications will be ordered by a physician, and their frequency and/or modality will be adjusted as defined by the patients Respiratory Severity Index (RSI) score. 2. If the patient does not have documented COPD, consider discontinuing anticholinergics when RSI is less than 9.  3. If the bronchospasm worsens (increased RSI), then the bronchodilator frequency can be increased to a maximum of every 4 hours. If greater than every 4 hours is required, the physician will be contacted. 4. If the bronchospasm improves, the frequency of the bronchodilator can be decreased, based on the patient's RSI, but not less than home treatment regimen frequency. 5. Bronchodilator(s) will be discontinued if patient has a RSI less than 9 and has received no scheduled or as needed treatment for 72  Hrs. Patients Ordered on a Mucolytic Agent:    1. Must always be administered with a bronchodilator. 2. Discontinue if patient experiences worsened bronchospasm, or secretions have lessened to the point that the patient is able to clear them with a cough. Anti-inflammatory and Combination Medications:    1. If the patient lacks prior history of lung disease, is not using inhaled anti-inflammatory medication at home, and lacks wheezing by examination or by history for at least 24 hours, contact physician for possible discontinuation.

## 2020-02-11 NOTE — PROGRESS NOTES
Progress Note    Admit Date: 2/10/2020  Day: 2  Diet: Diet NPO Effective Now Exceptions are: Ice Chips    CC: UTI    Interval history: Stable overnight. Continues on day #2 rocephin for UTI. This AM she reports she feels about the same as yesterday though dysuria is slightly better. No other complaints and other than urinary sx she feels at baseline. Patient reportedly had some eye crusting overnight but denies subjective ocular complaints including no dry or scratchy eyes or unusual discharge.     Medications:     Scheduled Meds:   amantadine  100 mg Oral Daily    atorvastatin  40 mg Oral Nightly    carvedilol  3.125 mg Oral BID WC    donepezil  10 mg Oral Nightly    fluticasone  2 spray Nasal Daily    furosemide  20 mg Oral Daily    polyethylene glycol  17 g Oral Daily    potassium chloride  10 mEq Oral Daily    famotidine  20 mg Oral Daily    senna  2 tablet Oral Nightly    [START ON 2/17/2020] vitamin D  50,000 Units Oral Weekly    carbidopa-levodopa  1.5 tablet Oral 4x Daily    carbidopa-levodopa  2 tablet Oral Nightly    sodium chloride flush  10 mL Intravenous 2 times per day    enoxaparin  40 mg Subcutaneous Nightly    thiamine  100 mg Oral Daily    cefTRIAXone (ROCEPHIN) IV  1 g Intravenous Q24H    LORazepam  0.5 mg Oral Nightly     Continuous Infusions:  PRN Meds:albuterol, acetaminophen, guaiFENesin-dextromethorphan, sodium chloride flush, magnesium hydroxide, perflutren lipid microspheres    Objective:   Vitals:   T-max:  Patient Vitals for the past 8 hrs:   BP Temp Temp src Pulse Resp SpO2 Weight   02/11/20 0400 -- -- -- -- -- -- 151 lb 10.8 oz (68.8 kg)   02/11/20 0357 111/67 97.6 °F (36.4 °C) Oral 70 18 93 % --   02/10/20 2323 116/64 98 °F (36.7 °C) Oral 71 20 92 % --       Intake/Output Summary (Last 24 hours) at 2/11/2020 0641  Last data filed at 2/11/2020 0402  Gross per 24 hour   Intake 100 ml   Output --   Net 100 ml       Review of Systems   Genitourinary: Positive for dysuria. All other systems reviewed and are negative. Physical Exam  Constitutional:       General: She is not in acute distress. Appearance: She is ill-appearing. HENT:      Head: Normocephalic and atraumatic. Right Ear: External ear normal.      Left Ear: External ear normal.      Nose: Nose normal. No rhinorrhea. Mouth/Throat:      Mouth: Mucous membranes are moist.      Pharynx: Oropharynx is clear. Eyes:      General: No scleral icterus. Right eye: No discharge. Left eye: No discharge. Extraocular Movements: Extraocular movements intact. Conjunctiva/sclera: Conjunctivae normal.   No ocular discharge, no scleral injection  Neck:      Musculoskeletal: Normal range of motion and neck supple. Cardiovascular:      Rate and Rhythm: Normal rate and regular rhythm. Pulses: Normal pulses. Heart sounds: Murmur (holosystolic best ascultated over aortic area) present. Pulmonary:      Effort: Pulmonary effort is normal.      Breath sounds: Normal breath sounds. Abdominal:      General: There is no distension. Palpations: Abdomen is soft. Tenderness: There is no abdominal tenderness. Musculoskeletal:         General: No swelling or deformity. Right lower leg: No edema. Left lower leg: No edema. Skin:     General: Skin is warm and dry. Neurological:      General: No focal deficit present. Mental Status: She is alert and oriented to person, place, and time. Cranial Nerves: No cranial nerve deficit. Comments: Slowed, quiet speech.  slowed movements of limbs and face generally   Psychiatric:         Mood and Affect: Mood normal.         Behavior: Behavior normal.     LABS:    CBC:   Recent Labs     02/10/20  1509   WBC 7.6   HGB 15.1   HCT 46.2      MCV 88.3     Renal:    Recent Labs     02/10/20  1509 02/10/20  2050     --    K 3.7  --    CL 96*  --    CO2 30  --    BUN 25*  --    CREATININE 1.0  --    GLUCOSE 86  --    CALCIUM 10.2  --    MG  --  2.10   ANIONGAP 15  --      Hepatic:   Recent Labs     02/10/20  1509   AST 17   ALT 8*   BILITOT 0.9   PROT 7.3   LABALBU 4.3   ALKPHOS 113     Troponin:   Recent Labs     02/10/20  1509 02/10/20  2050 02/11/20  0302   TROPONINI 0.02* 0.02* 0.03*     BNP: No results for input(s): BNP in the last 72 hours. Lipids: No results for input(s): CHOL, HDL in the last 72 hours. Invalid input(s): LDLCALCU, TRIGLYCERIDE  ABGs:  No results for input(s): PHART, OHW9DQM, PO2ART, CLW0VWE, BEART, THGBART, K3KBZUAA, KRH2LCO in the last 72 hours. INR: No results for input(s): INR in the last 72 hours. Lactate: No results for input(s): LACTATE in the last 72 hours. Cultures:  -----------------------------------------------------------------  RAD:   CT Head WO Contrast   Final Result   Impression:       No acute intracranial abnormality. XR CHEST STANDARD (2 VW)   Final Result   1. No acute disease. Assessment/Plan:     [de-identified] yo F pmhx parkinson's disease here with UTI     #UTI: better w/ abx  - UCx pending  - c/w rocephin day 2     #Parkinson's Disease  - continue home amantadine, sinemet, aricept  - supportive care including q2h turns, elevate heels, routine skin care  - c/w home breathing treatments, mucinex  - zantac, bowel regimen  - MBS today with SLP  - thiamine, calcium supplements  - visine ordered prn for dry eyes. No evidence of conjunctivitis or other ocular infection on examination  - PT, OT, SLP  - dietician consulted     #Chronic Systolic CHF: stable. last known echo 2015 w/ EF 40%. Moderate AS. BNP slightly above baseline at admission but clinically not overloaded, has no increased o2 requirement, no LE edema  - repeat echo as most recent from 2015  - c/w home coreg  - c/w home lasix, hold aldactone to avoid overdiuresis     #Troponinemia: stable. 2/2 demand     #Long QTC: 586ms on admission.  no significant electrolyte abnormalities  - avoid QTC prolonging meds where possible.  Not able to safely hold parkinson's meds  - monitor electrolytes, replete PRN  - telemetry     #anxiety  - continue home ativan (takes chronically)     #CAD  - c/w home statin     Will discuss with attending physician Dr. Jenna Salcido     Code Status: Limited - do not intubate  FEN: NPO pending [facility]  PPX: Lovenox  DISPO: Facundo Garcia MD, PGY-1  02/11/20  6:41 AM    This patient has been staffed and discussed with Karen Bernardo MD.

## 2020-02-11 NOTE — PROGRESS NOTES
Pt admitted to [address] from ED. VSS afebrile. Pt alert and oriented x4. Son at bedside briefly. Admission paperwork completed. Pt NPO. Per MD to have swallow study in am. MD did come speak to son about this and POC. Pt incontinent. Keeping clean and dry. Turning and repositioning with pillow support. AT boots in place. Will continue to monitor skin integrity.

## 2020-02-11 NOTE — PROGRESS NOTES
2.  Her family states that when they first arrived at the nursing home she was very sleepy although she seems to have perked up now. They are concerned that she may have an infection or something else going on. The patient denies pain, chest pain, shortness of breath, nausea, I am unable to get further history from her due to her difficulty with verbalization. \"     Impression  Dysphagia Diagnosis: Suspected needs further assessment   Dysphagia impression: pt alert and oriented, speech is slow and deliberate, with weak vocal quality secondary to Parkinson's. Pt exhibited difficulty producing and effective strong cough. Daughter in law states speech has been following pt at [place] and downgraded diet to [facility] soft. Pt analyzed with ice chips, water via straw ( pt preference) and applesauce. Pt exhibited weak cough on 1/5 trials of water, no other throat clear or cough noted. Swallow movement felt upon palpation of anterior neck. Pt exhibited adequate labial seal with no anterior loss. Recommend MBS as best practice due to pt history of Parkinson's, weak voice and cough as well as swallowing issues at [place]. Dysphagia Outcome Severity Scale: Level 4: Mild moderate dysphagia- Intermittent supervision/cueing.  One - two diet consistencies restricted     Treatment Plan  Requires SLP Intervention: Yes  Duration/Frequency of Treatment: TBD    Recommended Diet and Intervention  Diet Solids Recommendation: (TBD)  Liquid Consistency Recommendation: (TBD)  Recommendations: Modified barium swallow study  Therapeutic Interventions: Patient/Family education;Oral care    Compensatory Swallowing Strategies  Compensatory Swallowing Strategies: (TBD)    Treatment/Goals  1- The patient will tolerate instrumental swallowing procedure    2- The patient Maryjane So will verbalize/demonstrate understanding of dysphagia recommendations  2/11: Educated pt and daughter in law to purpose of visit, s/s of aspiration, silent aspiration and concern if aspiration occurs, as well as rationale for MBS recommendation . Both stated comprehension and agreeable  Cont goal    General  Chart Reviewed: Yes  Behavior/Cognition: Alert; Cooperative  Communication Observation: Dysarthria(secondary to Parkinson)  Follows Directions: Simple  Dentition: Adequate  Patient Positioning: Upright in bed  Baseline Vocal Quality: Weak  Volitional Cough: Weak  Prior Dysphagia History: pt hd MBS in 2012 with  no aspiration identified  Consistencies Administered: Dysphagia Pureed (Dysphagia I); Thin - cup; Thin - straw; Ice Chips; Thin - teaspoon    Vision/Hearing  Vision  Vision: Within Functional Limits  Hearing  Hearing: Within functional limits    Oral Motor Deficits  Oral/Motor  Oral Motor: (perioral weakness secondary to Parkinson's. No asymmetry noted)    Oral Phase Dysfunction  Pt demonstrating adequate labial seal, no anterior loss of liquid noted. Will assess solid during MBS study     Indicators of Pharyngeal Phase Dysfunction  Pt analyzed with ice chips, water via straw ( pt preference) and applesauce. Pt exhibited weak cough on 1/5 trials of water, no other throat clear or cough noted. Swallow movement felt upon palpation of anterior neck. Prognosis  Prognosis  Prognosis for safe diet advancement: fair  Barriers to reach goals: (history of Parkinson's)  Individuals consulted  Consulted and agree with results and recommendations: Patient;RN    Education  Patient Education: Pt and daughter in law educated to purpose of visit  Patient Education Response: Verbalizes understanding  Safety Devices in place: Yes  Type of devices: Call light within reach       Therapy Time  SLP Individual Minutes  Time In: 0850  Time Out: 0313  Minutes: 13     Plan  Recommended diet:  TBD after MBS  MBS today  Pt therapy goal: wants water  Pt dc goal: did not state    Dory Tellez M.S./[facility]SLP #[id number]  Pg.  # [phone]  Needs met prior to leaving room, call light within reach, d/w RN Nancy Garcia  This document will serve as a dc summary if pt dc prior to next visit  2/11/2020 9:09 AM

## 2020-02-12 VITALS
RESPIRATION RATE: 15 BRPM | HEART RATE: 65 BPM | OXYGEN SATURATION: 93 % | TEMPERATURE: 96.7 F | HEIGHT: 66 IN | SYSTOLIC BLOOD PRESSURE: 125 MMHG | WEIGHT: 149.91 LBS | DIASTOLIC BLOOD PRESSURE: 74 MMHG | BODY MASS INDEX: 24.09 KG/M2

## 2020-02-12 LAB
ANION GAP SERPL CALCULATED.3IONS-SCNC: 14 MMOL/L (ref 3–16)
BASOPHILS ABSOLUTE: 0.1 K/UL (ref 0–0.2)
BASOPHILS RELATIVE PERCENT: 0.8 %
BUN BLDV-MCNC: 24 MG/DL (ref 7–20)
CALCIUM SERPL-MCNC: 9.9 MG/DL (ref 8.3–10.6)
CHLORIDE BLD-SCNC: 97 MMOL/L (ref 99–110)
CO2: 27 MMOL/L (ref 21–32)
CREAT SERPL-MCNC: 0.9 MG/DL (ref 0.6–1.2)
EOSINOPHILS ABSOLUTE: 0.2 K/UL (ref 0–0.6)
EOSINOPHILS RELATIVE PERCENT: 2.6 %
GFR AFRICAN AMERICAN: >60
GFR NON-AFRICAN AMERICAN: >60
GLUCOSE BLD-MCNC: 100 MG/DL (ref 70–99)
HCT VFR BLD CALC: 41.2 % (ref 36–48)
HEMOGLOBIN: 13.5 G/DL (ref 12–16)
LYMPHOCYTES ABSOLUTE: 1 K/UL (ref 1–5.1)
LYMPHOCYTES RELATIVE PERCENT: 16.1 %
MCH RBC QN AUTO: 29.1 PG (ref 26–34)
MCHC RBC AUTO-ENTMCNC: 32.8 G/DL (ref 31–36)
MCV RBC AUTO: 88.7 FL (ref 80–100)
MONOCYTES ABSOLUTE: 0.5 K/UL (ref 0–1.3)
MONOCYTES RELATIVE PERCENT: 8.9 %
NEUTROPHILS ABSOLUTE: 4.4 K/UL (ref 1.7–7.7)
NEUTROPHILS RELATIVE PERCENT: 71.6 %
PDW BLD-RTO: 14 % (ref 12.4–15.4)
PLATELET # BLD: 214 K/UL (ref 135–450)
PMV BLD AUTO: 10.1 FL (ref 5–10.5)
POTASSIUM REFLEX MAGNESIUM: 3.6 MMOL/L (ref 3.5–5.1)
RBC # BLD: 4.64 M/UL (ref 4–5.2)
SODIUM BLD-SCNC: 138 MMOL/L (ref 136–145)
WBC # BLD: 6.2 K/UL (ref 4–11)

## 2020-02-12 PROCEDURE — 6360000002 HC RX W HCPCS: Performed by: STUDENT IN AN ORGANIZED HEALTH CARE EDUCATION/TRAINING PROGRAM

## 2020-02-12 PROCEDURE — 85025 COMPLETE CBC W/AUTO DIFF WBC: CPT

## 2020-02-12 PROCEDURE — 6370000000 HC RX 637 (ALT 250 FOR IP): Performed by: STUDENT IN AN ORGANIZED HEALTH CARE EDUCATION/TRAINING PROGRAM

## 2020-02-12 PROCEDURE — 36415 COLL VENOUS BLD VENIPUNCTURE: CPT

## 2020-02-12 PROCEDURE — 92526 ORAL FUNCTION THERAPY: CPT

## 2020-02-12 PROCEDURE — 80048 BASIC METABOLIC PNL TOTAL CA: CPT

## 2020-02-12 PROCEDURE — 2580000003 HC RX 258: Performed by: STUDENT IN AN ORGANIZED HEALTH CARE EDUCATION/TRAINING PROGRAM

## 2020-02-12 RX ORDER — NITROFURANTOIN 25; 75 MG/1; MG/1
100 CAPSULE ORAL 2 TIMES DAILY
Qty: 6 CAPSULE | Refills: 0 | Status: SHIPPED | OUTPATIENT
Start: 2020-02-12 | End: 2020-02-15

## 2020-02-12 RX ADMIN — AMANTADINE HYDROCHLORIDE 100 MG: 100 CAPSULE ORAL at 08:10

## 2020-02-12 RX ADMIN — FUROSEMIDE 20 MG: 20 TABLET ORAL at 08:10

## 2020-02-12 RX ADMIN — TETRAHYDROZOLINE HCL 0.05% 1 DROP: 0.05 SOLUTION/ DROPS INTRAOCULAR at 06:09

## 2020-02-12 RX ADMIN — POTASSIUM CHLORIDE 10 MEQ: 10 TABLET, EXTENDED RELEASE ORAL at 08:10

## 2020-02-12 RX ADMIN — CARVEDILOL 3.12 MG: 3.12 TABLET, FILM COATED ORAL at 08:09

## 2020-02-12 RX ADMIN — FAMOTIDINE 20 MG: 20 TABLET ORAL at 08:10

## 2020-02-12 RX ADMIN — FLUTICASONE PROPIONATE 2 SPRAY: 50 SPRAY, METERED NASAL at 08:11

## 2020-02-12 RX ADMIN — CEFTRIAXONE 1 G: 1 INJECTION, POWDER, FOR SOLUTION INTRAMUSCULAR; INTRAVENOUS at 08:11

## 2020-02-12 RX ADMIN — CARBIDOPA AND LEVODOPA 1.5 TABLET: 25; 100 TABLET ORAL at 11:33

## 2020-02-12 RX ADMIN — POLYETHYLENE GLYCOL 3350 17 G: 17 POWDER, FOR SOLUTION ORAL at 08:09

## 2020-02-12 RX ADMIN — Medication 10 ML: at 08:10

## 2020-02-12 RX ADMIN — Medication 100 MG: at 08:10

## 2020-02-12 RX ADMIN — CARBIDOPA AND LEVODOPA 1.5 TABLET: 25; 100 TABLET ORAL at 08:10

## 2020-02-12 ASSESSMENT — PAIN SCALES - GENERAL
PAINLEVEL_OUTOF10: 0
PAINLEVEL_OUTOF10: 0

## 2020-02-12 NOTE — PROGRESS NOTES
IV and tele removed at this time. All belongings packed. Patient informed of discharge time of 1430 today. Report called to juan david and given to Kim. All questions answered.

## 2020-02-12 NOTE — DISCHARGE SUMMARY
Hospital Medicine Discharge Summary    Patient: Ruby Castrejon     Gender: female  : 1940   Age: [de-identified] y.o. MRN: [id number]    Code Status: Limited     Primary Care Provider: Genoveva Beard MD    Admit Date: 2/10/2020   Discharge Date:   2020    Admitting Physician: Austin Lamb MD  Discharge Physician: Chris De Los Santos MD     Discharge Diagnoses: Active Hospital Problems    Diagnosis Date Noted    UTI (urinary tract infection) [N39.0] 02/10/2020       Hospital Course:   Debbie Urbina is a [de-identified] yo F pmhx parkinson's disease, CAD w/ prior MI who presents with dysuria. She resides at a SNF and has been having subacute functional decline acutely worsened on the day of admission when she was noted by staff to be less communicative, minimally verbal. Family reported in the ED that she seemed to be very sleepy. UTI in the ED concerning for possible infection and she received a dose of rocephin in the ED and was also given some fluids as she was clinically hypovolemic on arrival. Otherwise on workup, no leukocytosis, no significant electrolyte derangements, glucose WNL. Slightly elevated troponin to 0.02. EKG w/ QTc 586 ms but otherwise stable vs 2019 EKG. The patient received a a three-day-course of ceftriaxone and improved clinically throughout the course of admission. She is discharged today (2020) in stable condition. The patient is instructed to complete a course of nitrofurantoin and follow-up with her primary care physician in one-week. Disposition: To a non-[facility] facility    Exam:     /74   Pulse 65   Temp 96.7 °F (35.9 °C) (Oral)   Resp 15   Ht 5' 6\" (1.676 m)   Wt 149 lb 14.6 oz (68 kg)   SpO2 93%   BMI 24.20 kg/m²     Constitutional:       General: She is not in acute distress.     Appearance: She is ill-appearing.    HENT:      Head: Normocephalic and atraumatic.      Right Ear: External ear normal.      Left Ear: External ear normal.      Nose: Nose normal. No rhinorrhea.      Mouth/Throat:      Mouth: Mucous membranes are moist.      Pharynx: Oropharynx is clear. Eyes:      General: No scleral icterus.        Right eye: No discharge.         Left eye: No discharge.      Extraocular Movements: Extraocular movements intact.      Conjunctiva/sclera: Conjunctivae normal.   No ocular discharge, no scleral injection  Neck:      Musculoskeletal: Normal range of motion and neck supple. Cardiovascular:      Rate and Rhythm: Normal rate and regular rhythm.      Pulses: Normal pulses.      Heart sounds: Murmur (holosystolic best ascultated over aortic area) present. Pulmonary:      Effort: Pulmonary effort is normal.      Breath sounds: Normal breath sounds. Abdominal:      General: There is no distension.      Palpations: Abdomen is soft.      Tenderness: There is no abdominal tenderness. Musculoskeletal:         General: No swelling or deformity.      Right lower leg: No edema.      Left lower leg: No edema. Skin:     General: Skin is warm and dry. Neurological:      General: No focal deficit present.      Mental Status: She is alert and oriented to person, place, and time.      Cranial Nerves: No cranial nerve deficit.      Comments: Slowed, quiet speech. slowed movements of limbs and face generally   Psychiatric:         Mood and Affect: Mood normal.         Behavior: Behavior normal.       Consults:     IP CONSULT TO HOSPITALIST  IP CONSULT TO DIETITIAN  IP CONSULT TO CASE MANAGEMENT    Labs:  For convenience and continuity at follow-up the following most recent labs are provided:    Lab Results   Component Value Date    WBC 6.2 02/12/2020    HGB 13.5 02/12/2020    HCT 41.2 02/12/2020    MCV 88.7 02/12/2020     02/12/2020     02/12/2020    K 3.6 02/12/2020    CL 97 02/12/2020    CO2 27 02/12/2020    BUN 24 02/12/2020    CREATININE 0.9 02/12/2020    CALCIUM 9.9 02/12/2020    PHOS 7.0 10/03/2012    BNP 1,513 10/09/2012    ALKPHOS 113 02/10/2020 ALT 8 02/10/2020    AST 17 02/10/2020    BILITOT 0.9 02/10/2020    LABALBU 4.3 02/10/2020    LDLCALC 94 01/31/2013    TRIG 116 01/31/2013     No results found for: INR    Radiology:  FL MODIFIED BARIUM SWALLOW W VIDEO   Final Result      No evidence of steven aspiration. One episode of flash penetration was seen when thin barium was swallowed with straw. For detailed discussion please refer to the electronic report of the speech pathologist from the same day. CT Head WO Contrast   Final Result   Impression:       No acute intracranial abnormality. XR CHEST STANDARD (2 VW)   Final Result   1. No acute disease. EKG     Rhythm: normal sinus   Rate: normal  Clinical Impression: no acute changes    Discharge Medications:   Current Discharge Medication List      START taking these medications    Details   nitrofurantoin, macrocrystal-monohydrate, (MACROBID) 100 MG capsule Take 1 capsule by mouth 2 times daily for 3 days  Qty: 6 capsule, Refills: 0           Current Discharge Medication List        Current Discharge Medication List      CONTINUE these medications which have NOT CHANGED    Details   Amantadine (SYMMETREL) 100 MG TABS tablet Take 100 mg by mouth daily       donepezil (ARICEPT ODT) 10 MG disintegrating tablet Take 10 mg by mouth nightly       gabapentin (NEURONTIN) 100 MG capsule Take 100 mg by mouth every 8 hours.        atorvastatin (LIPITOR) 40 MG tablet Take 40 mg by mouth nightly      potassium chloride (MICRO-K) 10 MEQ extended release capsule Take 10 mEq by mouth daily      ranitidine (ZANTAC) 150 MG tablet Take 150 mg by mouth daily      mirtazapine (REMERON) 15 MG tablet Take 15 mg by mouth daily      senna (SENOKOT) 8.6 MG tablet Take 2 tablets by mouth nightly      vitamin D (ERGOCALCIFEROL) 1.25 MG (77888 UT) CAPS capsule Take 50,000 Units by mouth once a week Mondays      spironolactone (ALDACTONE) 25 MG tablet Take 12.5 mg by mouth daily      carvedilol (COREG) 3.125 MG

## 2020-02-12 NOTE — CARE COORDINATION
Case Management Assessment            Discharge Note                    Date / Time of Note: 2/12/2020 11:02 AM                  Discharge Note Completed by: Krishna Song    Patient Name: Smith Christian   YOB: 1940  Diagnosis: UTI (urinary tract infection) [N39.0]  UTI (urinary tract infection) [N39.0]   Date / Time: 2/10/2020  2:02 PM    Current PCP: Jin Garcia MD  Clinic patient: No    Hospitalization in the last 30 days: No    Advance Directives:  Code Status: Limited  [place] DNR form completed and on chart: Yes    Financial:  Payor: Lucía Finders / Plan: Maxx Grammes / Product Type: *No Product type* /      Pharmacy:    [address]  Phone: [phone] Fax: [phone]    [facility] [address], [place] [address] [place] [address] [phone] [facility] [phone]  . [facility]to [address] [place] [id number]  Phone: [phone] Fax: [phone]      Assistance purchasing medications?: Potential Assistance Purchasing Medications: No  Assistance provided by Case Management: None at this time    Does patient want to participate in local refill/ meds to beds program?: No    Meds To Beds General Rules:  1. Can ONLY be done Monday- Friday between 8:30am-5pm  2. Prescription(s) must be in pharmacy by 3pm to be filled same day  3. Copy of patient's insurance/ prescription drug card and patient face sheet must be sent along with the prescription(s)  4. Cost of Rx cannot be added to hospital bill. If financial assistance is needed, please contact unit  or ;  or  CANNOT provide pharmacy voucher for patients co-pays  5.  Patients can then  the prescription on their way out of the hospital at discharge, or pharmacy can deliver to the bedside if staff is available. (payment due at time of

## 2020-02-12 NOTE — PROGRESS NOTES
Speech Language Pathology  Facility/Department: [address]  Dysphagia Daily Treatment Note  Late entry for ~ 1220    NAME: Beba Pritchett  : 1940  MRN: [id number]    Patient Diagnosis(es):   Patient Active Problem List    Diagnosis Date Noted    UTI (urinary tract infection) 02/10/2020    Aortic stenosis 2015    Parkinson's disease ([address] 10/22/2014    MR (mitral regurgitation) 10/22/2014    CHF (congestive heart failure) ([address] 2013    Cardiomyopathy ([address] 2013    Ventricular fibrillation ([address] 10/06/2012    Pneumonia 10/06/2012    Tremor 10/02/2012    LV dysfunction 2011    CAD (coronary artery disease) 2011    Abnormal cardiovascular stress test 2011    Abnormal EKG 2011    Hyperlipidemia 2011     Allergies: Allergies   Allergen Reactions    Codeine Anxiety   Recent Chest Xray 2/10/2020  1. No acute disease.     CT of head 2/10/2020  Impression:        No acute intracranial abnormality.        Previous MBS - none  Chart reviewed. Medical Diagnosis: UTI  Treatment Diagnosis:dysphagia     BSE Impression 2020  pt alert and oriented, speech is slow and deliberate, with weak vocal quality secondary to Parkinson's. Pt exhibited difficulty producing and effective strong cough. Daughter in law states speech has been following pt at [facility] and downgraded diet to [facility] soft. Pt analyzed with ice chips, water via straw ( pt preference) and applesauce. Pt exhibited weak cough on 1/5 trials of water, no other throat clear or cough noted. Swallow movement felt upon palpation of anterior neck. Pt exhibited adequate labial seal with no anterior loss. Recommend MBS as best practice due to pt history of Parkinson's, weak voice and cough as well as swallowing issues at [facility]. MBS results 2020  Puree, thin liquids via tsp/cup and straw, as well s soft solid were presented.   Pt exhibiting oral phase dysphagia, characterized by  very slow mastication and posterior propulsion with soft solid, with min residue in oral cavity. Pt demonstrated adequate labial seal, no anterior loss of bolus noted. Pt exhibited intermittent flash penetration with spontaneous clearing, of thin liquids (1 x cup and 1 x straw, with multiple trials presented). No deep penetration or aspiration was identified. There was no pharyngeal residue.     Pain: none reported    Current Diet : dysphagia II minced and moist with thin liquids    Treatment:  Pt seen bedside to address the following goals:  1- The patient will tolerate instrumental swallowing procedure   2/11:  Goal met     2- The patient [place] will verbalize/demonstrate understanding of dysphagia recommendations  2/11: Educated pt and daughter in law to purpose of visit, s/s of aspiration, silent aspiration and concern if aspiration occurs, as well as rationale for MBS recommendation . Both stated comprehension and agreeable  Cont goal  2/11: s/p MBS: pt and daughter in law educated to anatomy/physiology of swallow, results of MBS and strategies to increase safety of swallow. Emphasized importance of being upright with all PO (daughter in law states pt chair is not in optimal position at this time but is getting a new chair). Also the importance of chewing carefull and checking for oral residue. Both stated comprehension but will benefit from cont education and intervention  Cont goal  2/12: reviewed rationale for current diet with pt. Reinforced importance of sitting fully upright with all PO (pt reclined when entered room). Pt with difficulty maintaining position as she leans to right. Pt feeding self at appropriate rate and small bites/sips. Explained will recommend SLP cont to follow at SNF, pt stated understanding  Goal met, cont to ensure consistency    New goal:  3-  Pt will consume PO safely without signs or symptoms of aspiration  2/12: pt analyzed with portion of lunch.   Pt consuming raspberry mousse and mashed potatoes, did not like the meatloaf of green beans. Pt with cont slow mastication and propulsion of bolus posteriorly in oral cavity, though able to clear. No overt signs of aspiration with liquids via straw. No respiratory decline per chart review. Goal met, cont to ensure consistency    Patient/Family/Caregiver Education:  As above    Compensatory Strategies:  Small bites/sips;  Upright as possible for all oral intake  Check for pocketing of food      Plan:  Continued daily Dysphagia treatment with goals per  plan of care. Diet recommendations: cont dysphagia II minced and moist with thin liquids  DC recommendation: ongoing follow up by SLP recommended  Treatment: 15  D/W nursing Amarilis  Needs met prior to leaving room, call button in reach. Ralf Ramirez M.S./[facility]-SLP #[id number]  Pg.  # [phone]  If patient is discharged prior to next treatment, this note will serve as the discharge summary

## 2020-02-14 LAB
BLOOD CULTURE, ROUTINE: NORMAL
CULTURE, BLOOD 2: NORMAL

## 2020-03-11 PROBLEM — N39.0 UTI (URINARY TRACT INFECTION): Status: RESOLVED | Noted: 2020-02-10 | Resolved: 2020-03-11

## 2023-12-27 NOTE — PLAN OF CARE
Patient comes in by EMS for complaints of lower back pain for several weeks. Patient states that today the pain radiated around to her right front side. Problem: Falls - Risk of:  Goal: Will remain free from falls  Description  Will remain free from falls  2/12/2020 0400 by Anne Torres RN  Outcome: Ongoing  Note:   Patient at risk for falls. Patient resting quietly in bed. Side rails up x 3. Bed locked in lowest position. Bed alarm on. Bedside table and call light within reach. Patient instructed to call for assistance. Patient verbalized understanding. No attempts to get up on own, hourly rounding in place. Will continue to monitor. Problem: Risk for Impaired Skin Integrity  Goal: Tissue integrity - skin and mucous membranes  Description  Structural intactness and normal physiological function of skin and  mucous membranes. 2/12/2020 0400 by Anne Torres RN  Outcome: Ongoing  Note:   Pt at risk for skin breakdown. Skin assessment complete. Pts skin cleansed with inc cleanser and barrier cream applied to buttocks (redness). Pt repositioned in bed with pillow support q 2 . Will continue to monitor. Problem: Urinary Elimination:  Goal: Signs and symptoms of infection will decrease  Description  Signs and symptoms of infection will decrease  Outcome: Ongoing  Note:   Wbc is WNL. Pt c/o slight dysuria on urination, pt incont of b/b. Pt changed as needed. Pt receiving intermittent IV antibiotics. Waiting on sensitivity culture to result. Will continue to monitor.

## 2024-12-01 NOTE — PROGRESS NOTES
4 Eyes Admission Assessment     I agree as the admission nurse that 2 RN's have performed a thorough Head to Toe Skin Assessment on the patient. ALL assessment sites listed below have been assessed on admission. Areas assessed by both nurses:   [x]   Head, Face, and Ears   [x]   Shoulders, Back, and Chest  [x]   Arms, Elbows, and Hands   [x]   Coccyx, Sacrum, and Ischum  []   Legs, Feet, and Heels        Does the Patient have Skin Breakdown?   Yes a wound was noted on the Admission Assessment and an LDA was Initiated documentation include the Sherri-wound, Wound Assessment, Measurements, Dressing Treatment, Drainage, and Color\",         Willian Prevention initiated:  Yes   Wound Care Orders initiated:  Yes      [id number] nurse consulted for Pressure Injury (Stage 3,4, Unstageable, DTI, NWPT, and Complex wounds):  NA      Nurse 1 eSignature: Electronically signed by Linwood Ram RN on 2/10/20 at 10:30 PM    **SHARE this note so that the co-signing nurse is able to place an eSignature**    Nurse 2 eSignature: Electronically signed by Darron Sanabria RN on 2/11/20 at 4:28 AM Low Risk